# Patient Record
Sex: FEMALE | Race: WHITE | NOT HISPANIC OR LATINO | Employment: UNEMPLOYED | URBAN - METROPOLITAN AREA
[De-identification: names, ages, dates, MRNs, and addresses within clinical notes are randomized per-mention and may not be internally consistent; named-entity substitution may affect disease eponyms.]

---

## 2024-02-23 ENCOUNTER — APPOINTMENT (OUTPATIENT)
Dept: RADIOLOGY | Facility: HOSPITAL | Age: 59
End: 2024-02-23
Payer: COMMERCIAL

## 2024-02-23 ENCOUNTER — HOSPITAL ENCOUNTER (EMERGENCY)
Facility: HOSPITAL | Age: 59
Discharge: HOME | End: 2024-02-23
Attending: STUDENT IN AN ORGANIZED HEALTH CARE EDUCATION/TRAINING PROGRAM
Payer: COMMERCIAL

## 2024-02-23 VITALS
HEIGHT: 65 IN | RESPIRATION RATE: 17 BRPM | BODY MASS INDEX: 41.69 KG/M2 | HEART RATE: 74 BPM | DIASTOLIC BLOOD PRESSURE: 94 MMHG | SYSTOLIC BLOOD PRESSURE: 182 MMHG | WEIGHT: 250.22 LBS | OXYGEN SATURATION: 97 % | TEMPERATURE: 98.4 F

## 2024-02-23 DIAGNOSIS — S09.90XA HEAD INJURY, INITIAL ENCOUNTER: ICD-10-CM
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
## 2024-02-23 LAB
ALBUMIN SERPL BCP-MCNC: 4.7 G/DL (ref 3.4–5)
ALP SERPL-CCNC: 53 U/L (ref 33–136)
ALT SERPL W P-5'-P-CCNC: 25 U/L (ref 7–45)
ANION GAP SERPL CALC-SCNC: 14 MMOL/L (ref 10–20)
APPEARANCE UR: CLEAR
AST SERPL W P-5'-P-CCNC: 21 U/L (ref 9–39)
BASOPHILS # BLD AUTO: 0.08 X10*3/UL (ref 0–0.1)
BASOPHILS NFR BLD AUTO: 0.8 %
BILIRUB SERPL-MCNC: 0.8 MG/DL (ref 0–1.2)
BILIRUB UR STRIP.AUTO-MCNC: NEGATIVE MG/DL
BUN SERPL-MCNC: 7 MG/DL (ref 6–23)
CALCIUM SERPL-MCNC: 9.6 MG/DL (ref 8.6–10.3)
CHLORIDE SERPL-SCNC: 101 MMOL/L (ref 98–107)
CO2 SERPL-SCNC: 29 MMOL/L (ref 21–32)
COLOR UR: YELLOW
CREAT SERPL-MCNC: 0.65 MG/DL (ref 0.5–1.05)
EGFRCR SERPLBLD CKD-EPI 2021: >90 ML/MIN/1.73M*2
EOSINOPHIL # BLD AUTO: 0.18 X10*3/UL (ref 0–0.7)
EOSINOPHIL NFR BLD AUTO: 1.7 %
ERYTHROCYTE [DISTWIDTH] IN BLOOD BY AUTOMATED COUNT: 12.4 % (ref 11.5–14.5)
GLUCOSE SERPL-MCNC: 141 MG/DL (ref 74–99)
GLUCOSE UR STRIP.AUTO-MCNC: NEGATIVE MG/DL
HCT VFR BLD AUTO: 39.8 % (ref 36–46)
HGB BLD-MCNC: 13.5 G/DL (ref 12–16)
IMM GRANULOCYTES # BLD AUTO: 0.06 X10*3/UL (ref 0–0.7)
IMM GRANULOCYTES NFR BLD AUTO: 0.6 % (ref 0–0.9)
KETONES UR STRIP.AUTO-MCNC: NEGATIVE MG/DL
LEUKOCYTE ESTERASE UR QL STRIP.AUTO: NEGATIVE
LYMPHOCYTES # BLD AUTO: 3.85 X10*3/UL (ref 1.2–4.8)
LYMPHOCYTES NFR BLD AUTO: 36.2 %
MCH RBC QN AUTO: 29.8 PG (ref 26–34)
MCHC RBC AUTO-ENTMCNC: 33.9 G/DL (ref 32–36)
MCV RBC AUTO: 88 FL (ref 80–100)
MONOCYTES # BLD AUTO: 0.56 X10*3/UL (ref 0.1–1)
MONOCYTES NFR BLD AUTO: 5.3 %
NEUTROPHILS # BLD AUTO: 5.92 X10*3/UL (ref 1.2–7.7)
NEUTROPHILS NFR BLD AUTO: 55.4 %
NITRITE UR QL STRIP.AUTO: NEGATIVE
NRBC BLD-RTO: 0 /100 WBCS (ref 0–0)
PH UR STRIP.AUTO: 7 [PH]
PLATELET # BLD AUTO: 361 X10*3/UL (ref 150–450)
POTASSIUM SERPL-SCNC: 3.9 MMOL/L (ref 3.5–5.3)
PROT SERPL-MCNC: 7.2 G/DL (ref 6.4–8.2)
PROT UR STRIP.AUTO-MCNC: NEGATIVE MG/DL
RBC # BLD AUTO: 4.53 X10*6/UL (ref 4–5.2)
RBC # UR STRIP.AUTO: NEGATIVE /UL
SODIUM SERPL-SCNC: 140 MMOL/L (ref 136–145)
SP GR UR STRIP.AUTO: 1.01
UROBILINOGEN UR STRIP.AUTO-MCNC: <2 MG/DL
WBC # BLD AUTO: 10.7 X10*3/UL (ref 4.4–11.3)

## 2024-02-23 PROCEDURE — 80053 COMPREHEN METABOLIC PANEL: CPT | Performed by: STUDENT IN AN ORGANIZED HEALTH CARE EDUCATION/TRAINING PROGRAM

## 2024-02-23 PROCEDURE — 74177 CT ABD & PELVIS W/CONTRAST: CPT

## 2024-02-23 PROCEDURE — 72131 CT LUMBAR SPINE W/O DYE: CPT | Mod: RCN

## 2024-02-23 PROCEDURE — 72125 CT NECK SPINE W/O DYE: CPT

## 2024-02-23 PROCEDURE — 2500000001 HC RX 250 WO HCPCS SELF ADMINISTERED DRUGS (ALT 637 FOR MEDICARE OP): Performed by: STUDENT IN AN ORGANIZED HEALTH CARE EDUCATION/TRAINING PROGRAM

## 2024-02-23 PROCEDURE — 81003 URINALYSIS AUTO W/O SCOPE: CPT | Performed by: STUDENT IN AN ORGANIZED HEALTH CARE EDUCATION/TRAINING PROGRAM

## 2024-02-23 PROCEDURE — 2550000001 HC RX 255 CONTRASTS: Performed by: STUDENT IN AN ORGANIZED HEALTH CARE EDUCATION/TRAINING PROGRAM

## 2024-02-23 PROCEDURE — 36415 COLL VENOUS BLD VENIPUNCTURE: CPT | Performed by: STUDENT IN AN ORGANIZED HEALTH CARE EDUCATION/TRAINING PROGRAM

## 2024-02-23 PROCEDURE — 99285 EMERGENCY DEPT VISIT HI MDM: CPT | Mod: 25

## 2024-02-23 PROCEDURE — 71260 CT THORAX DX C+: CPT | Performed by: RADIOLOGY

## 2024-02-23 PROCEDURE — 72128 CT CHEST SPINE W/O DYE: CPT | Mod: RCN

## 2024-02-23 PROCEDURE — 72128 CT CHEST SPINE W/O DYE: CPT | Mod: RCN | Performed by: RADIOLOGY

## 2024-02-23 PROCEDURE — 72131 CT LUMBAR SPINE W/O DYE: CPT | Mod: RCN | Performed by: RADIOLOGY

## 2024-02-23 PROCEDURE — 74177 CT ABD & PELVIS W/CONTRAST: CPT | Performed by: RADIOLOGY

## 2024-02-23 PROCEDURE — 70450 CT HEAD/BRAIN W/O DYE: CPT

## 2024-02-23 PROCEDURE — 85025 COMPLETE CBC W/AUTO DIFF WBC: CPT | Performed by: STUDENT IN AN ORGANIZED HEALTH CARE EDUCATION/TRAINING PROGRAM

## 2024-02-23 PROCEDURE — 70450 CT HEAD/BRAIN W/O DYE: CPT | Performed by: RADIOLOGY

## 2024-02-23 PROCEDURE — 72125 CT NECK SPINE W/O DYE: CPT | Performed by: RADIOLOGY

## 2024-02-23 PROCEDURE — 51798 US URINE CAPACITY MEASURE: CPT

## 2024-02-23 RX ORDER — ACETAMINOPHEN AND CODEINE PHOSPHATE 300; 30 MG/1; MG/1
1 TABLET ORAL EVERY 6 HOURS PRN
Qty: 10 TABLET | Refills: 0 | Status: SHIPPED | OUTPATIENT
Start: 2024-02-23 | End: 2024-02-26

## 2024-02-23 RX ORDER — ACETAMINOPHEN AND CODEINE PHOSPHATE 300; 30 MG/1; MG/1
1 TABLET ORAL ONCE
Status: COMPLETED | OUTPATIENT
Start: 2024-02-23 | End: 2024-02-23

## 2024-02-23 RX ORDER — CYCLOBENZAPRINE HCL 5 MG
5 TABLET ORAL 3 TIMES DAILY PRN
Qty: 10 TABLET | Refills: 0 | Status: SHIPPED | OUTPATIENT
Start: 2024-02-23 | End: 2024-03-04

## 2024-02-23 RX ORDER — LIDOCAINE 50 MG/G
1 PATCH TOPICAL DAILY
Qty: 7 PATCH | Refills: 0 | Status: SHIPPED | OUTPATIENT
Start: 2024-02-23

## 2024-02-23 RX ORDER — ACETAMINOPHEN AND CODEINE PHOSPHATE 300; 30 MG/1; MG/1
1 TABLET ORAL EVERY 6 HOURS PRN
Qty: 10 TABLET | Refills: 0 | Status: SHIPPED | OUTPATIENT
Start: 2024-02-23 | End: 2024-02-23 | Stop reason: SDUPTHER

## 2024-02-23 RX ORDER — ONDANSETRON HYDROCHLORIDE 2 MG/ML
4 INJECTION, SOLUTION INTRAVENOUS ONCE
Status: DISCONTINUED | OUTPATIENT
Start: 2024-02-23 | End: 2024-02-23 | Stop reason: HOSPADM

## 2024-02-23 RX ADMIN — IOHEXOL 100 ML: 350 INJECTION, SOLUTION INTRAVENOUS at 17:00

## 2024-02-23 RX ADMIN — ACETAMINOPHEN AND CODEINE PHOSPHATE 1 TABLET: 300; 30 TABLET ORAL at 14:22

## 2024-02-23 ASSESSMENT — COLUMBIA-SUICIDE SEVERITY RATING SCALE - C-SSRS
1. IN THE PAST MONTH, HAVE YOU WISHED YOU WERE DEAD OR WISHED YOU COULD GO TO SLEEP AND NOT WAKE UP?: NO
2. HAVE YOU ACTUALLY HAD ANY THOUGHTS OF KILLING YOURSELF?: NO
6. HAVE YOU EVER DONE ANYTHING, STARTED TO DO ANYTHING, OR PREPARED TO DO ANYTHING TO END YOUR LIFE?: NO

## 2024-02-23 ASSESSMENT — PAIN DESCRIPTION - DESCRIPTORS: DESCRIPTORS: ACHING

## 2024-02-23 ASSESSMENT — PAIN SCALES - GENERAL
PAINLEVEL_OUTOF10: 7
PAINLEVEL_OUTOF10: 7
PAINLEVEL_OUTOF10: 3
PAINLEVEL_OUTOF10: 5 - MODERATE PAIN

## 2024-02-23 ASSESSMENT — PAIN DESCRIPTION - LOCATION
LOCATION: OTHER (COMMENT)
LOCATION: OTHER (COMMENT)

## 2024-02-23 ASSESSMENT — LIFESTYLE VARIABLES
EVER FELT BAD OR GUILTY ABOUT YOUR DRINKING: NO
HAVE YOU EVER FELT YOU SHOULD CUT DOWN ON YOUR DRINKING: NO
EVER HAD A DRINK FIRST THING IN THE MORNING TO STEADY YOUR NERVES TO GET RID OF A HANGOVER: NO
HAVE PEOPLE ANNOYED YOU BY CRITICIZING YOUR DRINKING: NO

## 2024-02-23 ASSESSMENT — PAIN - FUNCTIONAL ASSESSMENT
PAIN_FUNCTIONAL_ASSESSMENT: 0-10

## 2024-02-23 ASSESSMENT — PAIN DESCRIPTION - FREQUENCY: FREQUENCY: INTERMITTENT

## 2024-02-23 ASSESSMENT — PAIN DESCRIPTION - PAIN TYPE: TYPE: ACUTE PAIN

## 2024-02-23 ASSESSMENT — PAIN DESCRIPTION - PROGRESSION: CLINICAL_PROGRESSION: GRADUALLY WORSENING

## 2024-02-23 NOTE — ED PROVIDER NOTES
"HPI   Chief Complaint   Patient presents with    Fall     \"I was at a friends house and friend back up and I stepped off a step down that I did not see and fell on 2 wooden chairs.  No thinners.  No LOC.  This happened on Tuesday.\"  \"When I pee now it trickles out, I have no control.\"       Patient is a 58-year-old female with history of arthritis who presents for a fall.  Patient states she was at a friend's house on Tuesday, fell back off of a step down to 2 wooden chairs.  Hit her head and the back in 2 places.  Dors is pain on the left side of her thorax.  Denies any LOC, anticoagulation use.  States her lightheadedness is without room spinning.  Dors is mild nausea.  Endorses headache since her fall.  Denies abdominal pain, though does have some discomfort she describes as cramping.  No shortness of breath.  No saddle anesthesia or loss of bowel control.  States she has had some urinary incontinence, states she has had a past of this since she began menopause, more typically at night.                          Lucas Coma Scale Score: 15                     Patient History   History reviewed. No pertinent past medical history.  History reviewed. No pertinent surgical history.  No family history on file.  Social History     Tobacco Use    Smoking status: Never    Smokeless tobacco: Never   Vaping Use    Vaping Use: Never used   Substance Use Topics    Alcohol use: Never    Drug use: Never       Physical Exam   ED Triage Vitals [02/23/24 1313]   Temperature Heart Rate Respirations BP   36.9 °C (98.4 °F) 74 16 (!) 198/90      Pulse Ox Temp Source Heart Rate Source Patient Position   96 % Temporal Monitor Sitting      BP Location FiO2 (%)     Right arm --       Physical Exam  Constitutional:       General: She is not in acute distress.  HENT:      Head: Normocephalic.      Comments: Tenderness palpation over bilateral head with equivocal small cephalhematoma.    Eyes:      Extraocular Movements: Extraocular " movements intact.      Conjunctiva/sclera: Conjunctivae normal.      Pupils: Pupils are equal, round, and reactive to light.   Cardiovascular:      Rate and Rhythm: Normal rate and regular rhythm.      Pulses: Normal pulses.      Heart sounds: Normal heart sounds.   Pulmonary:      Effort: Pulmonary effort is normal.      Breath sounds: Normal breath sounds.   Abdominal:      General: There is no distension.      Palpations: Abdomen is soft. There is no mass.      Tenderness: There is no abdominal tenderness. There is no guarding.   Musculoskeletal:         General: No deformity.      Cervical back: Normal range of motion and neck supple.      Right lower leg: No edema.      Left lower leg: No edema.      Comments:   Tenderness to palpation over T-spine without step-offs or deformities.  No C or L-spine tenderness or step-offs or deformities.  Mild tenderness palpation to left posterior lower chest.   Skin:     General: Skin is warm and dry.      Findings: No lesion or rash.   Neurological:      General: No focal deficit present.      Mental Status: She is alert and oriented to person, place, and time. Mental status is at baseline.      Cranial Nerves: No cranial nerve deficit.      Sensory: No sensory deficit.      Motor: No weakness.   Psychiatric:         Mood and Affect: Mood normal.         ED Course & MDM   Diagnoses as of 02/24/24 1143   Fall, initial encounter   Head injury, initial encounter       Medical Decision Making  Patient is a 58-year-old female with above-stated past medical history who presents for multiple medical complaints.  Given patient's time since her injuries, and her clinical appearance, trauma activation was deferred.  Patient arrived in a c-collar and this was left in place.  Laboratory work does not show significant derangements.  Urinalysis is not consistent urinary tract infection.  Patient's postvoid residual was 0 mL which is not consistent with urinary retention.  She has no  saddle anesthesia or bowel control problems and an intact neuroexam, this lowers my suspicion for cord compression, spinal epidural abscess, cauda equina.  Patient's pain was treated and CT scans were ordered.  Patient was handed off pending CT scan results, reevaluation, and disposition.     Disclaimer: This note was dictated using speech recognition software. Minor errors in transcription may be present. Please call if questions.     Hipolito Chin MD  [facility]  Contact on [employer]        Problems Addressed:  Fall, initial encounter: acute illness or injury  Head injury, initial encounter: acute illness or injury    Amount and/or Complexity of Data Reviewed  Labs: ordered.  Radiology: ordered.        Procedure  Procedures     Hipolito Chin MD  02/24/24 7315

## 2024-02-23 NOTE — Clinical Note
Maria Del Carmen Alas was seen and treated in our emergency department on 2/23/2024.  She may return to work on 02/27/2024.       If you have any questions or concerns, please don't hesitate to call.      Lindsey Cleveland MD

## 2024-02-24 LAB — HOLD SPECIMEN: NORMAL

## 2024-02-24 NOTE — PROGRESS NOTES
"Emergency Medicine Transition of Care Note.    I received Maria Del Carmen Alas in signout from Dr. Chin.  Please see the previous ED provider note for all HPI, PE and MDM up to the time of signout at 1700. This is in addition to the primary record.    In brief Maria Del Carmen Alas is an 58 y.o. female presenting for   Chief Complaint   Patient presents with    Fall     \"I was at a friends house and friend back up and I stepped off a step down that I did not see and fell on 2 wooden chairs.  No thinners.  No LOC.  This happened on Tuesday.\"  \"When I pee now it trickles out, I have no control.\"     At the time of signout we were awaiting: CT scans    Diagnoses as of 02/23/24 2108   Fall, initial encounter   Head injury, initial encounter       Medical Decision Making      Final diagnoses:   [W19.XXXA] Fall, initial encounter   [S09.90XA] Head injury, initial encounter     58-year-old female states that 3 days ago she had a mechanical fall at a friend's house while she was trying to show her where a Box was.  She tripped over a step and fell onto a chair.  She states that she hit her head and also had some left lateral upper back pain.  She denies being on any blood thinners.  She had been given Tylenol 3 by the previous emergency physician.  She states that she came in because she has been having some worsening pain and feels like she is having a muscle spasm.  She has had no difficulty ambulating.  She drove here.  She is denying any numbness tingling or weakness.  She states that she has a history of urinary incontinence.  On my evaluation she does not have any midline C-spine T-spine LS-spine tenderness.  She does have some mild left lateral paraspinal tenderness.  Abdomen is benign.  There is no ecchymosis.  No saddle anesthesia.  She is able to ambulate.  At this time I did recommend that we give her pain medication.  She states that she really does not want anything except for Tylenol 3's.  She stated that she would " "do a low-dose of a muscle relaxer.  She states that she also would do a Lidoderm patch.  Patient had been pan scanned by the previous physician.  CT head C-spine chest abdomen pelvis T-spine as well as L-spine.  There was no acute traumatic injuries noted.  Therefore at this time patient is asking for food.  She will be discharged home as per her request.  I will have her follow-up with orthopedics for further evaluation of any lingering issues.  All her at this time I do not see evidence of acute cauda equina syndrome.  She has no saddle anesthesia negative straight leg raise and she is able to ambulate.  Candy is at the bedside.  She should have a low threshold to return.  She states that she has to drive home.  He was given sedation precautions      Procedure  Procedures    Lindsey Cleveland MD Maria Del Carmen Alas is a 58 y.o. female on day 0 of admission presenting with No Principal Problem: There is no principal problem currently on the Problem List. Please update the Problem List and refresh..    Subjective   Fall       Objective     Physical Exam    Last Recorded Vitals  Blood pressure (!) 182/94, pulse 74, temperature 36.9 °C (98.4 °F), temperature source Temporal, resp. rate 17, height 1.651 m (5' 5\"), weight 113 kg (250 lb 3.6 oz), SpO2 97 %.  Intake/Output last 3 Shifts:  No intake/output data recorded.    Relevant Results              Labs Reviewed   COMPREHENSIVE METABOLIC PANEL - Abnormal       Result Value    Glucose 141 (*)     Sodium 140      Potassium 3.9      Chloride 101      Bicarbonate 29      Anion Gap 14      Urea Nitrogen 7      Creatinine 0.65      eGFR >90      Calcium 9.6      Albumin 4.7      Alkaline Phosphatase 53      Total Protein 7.2      AST 21      Bilirubin, Total 0.8      ALT 25     URINALYSIS WITH REFLEX CULTURE AND MICROSCOPIC - Normal    Color, Urine Yellow      Appearance, Urine Clear      Specific Gravity, Urine 1.006      pH, Urine 7.0      Protein, Urine NEGATIVE      " Glucose, Urine NEGATIVE      Blood, Urine NEGATIVE      Ketones, Urine NEGATIVE      Bilirubin, Urine NEGATIVE      Urobilinogen, Urine <2.0      Nitrite, Urine NEGATIVE      Leukocyte Esterase, Urine NEGATIVE     CBC WITH AUTO DIFFERENTIAL    WBC 10.7      nRBC 0.0      RBC 4.53      Hemoglobin 13.5      Hematocrit 39.8      MCV 88      MCH 29.8      MCHC 33.9      RDW 12.4      Platelets 361      Neutrophils % 55.4      Immature Granulocytes %, Automated 0.6      Lymphocytes % 36.2      Monocytes % 5.3      Eosinophils % 1.7      Basophils % 0.8      Neutrophils Absolute 5.92      Immature Granulocytes Absolute, Automated 0.06      Lymphocytes Absolute 3.85      Monocytes Absolute 0.56      Eosinophils Absolute 0.18      Basophils Absolute 0.08     URINALYSIS WITH REFLEX CULTURE AND MICROSCOPIC    Narrative:     The following orders were created for panel order Urinalysis with Reflex Culture and Microscopic.  Procedure                               Abnormality         Status                     ---------                               -----------         ------                     Urinalysis with Reflex C...[[id number]]  Normal              Final result               Extra Urine Gray Tube[[id number]]                            In process                   Please view results for these tests on the individual orders.   EXTRA URINE GRAY TUBE        CT thoracic spine wo IV contrast   Final Result   No acute fracture or dislocation in the thoracic and lumbar spine   spine. Advanced spondylosis of the mid and lower thoracic levels.        The current study does not evaluate for ligamentous laxity or injury.   Evaluation of contents of the spinal canal is suboptimal. Follow   trauma protocol.        Please refer to the concurrent CT chest and CT abdomen and pelvis   studies for details of structures of the chest and abdomen.        Signed by: Dina Gonzales 2/23/2024 5:46 PM   Dictation workstation:   [id number]      CT  lumbar spine wo IV contrast   Final Result   No acute fracture or dislocation in the thoracic and lumbar spine   spine. Advanced spondylosis of the mid and lower thoracic levels.        The current study does not evaluate for ligamentous laxity or injury.   Evaluation of contents of the spinal canal is suboptimal. Follow   trauma protocol.        Please refer to the concurrent CT chest and CT abdomen and pelvis   studies for details of structures of the chest and abdomen.        Signed by: Dina Gonzales 2/23/2024 5:46 PM   Dictation workstation:   [id number]      CT chest abdomen pelvis w IV contrast   Final Result   CT CHEST/ABDOMEN/PELVIS:   No acute traumatic findings.        Fatty infiltration of the liver.        Suggestion of a fat density lesion surrounding the left kidney. This   is somewhat equivocal and could represent could represent a   angiomyolipoma. Body habitus limits sensitivity. Further evaluation   is advised with MRI.        Enlarged heterogeneous uterus compatible with myomatous changes.   Equivocal bladder wall thickening versus poor distention. Correlation   with urinalysis advised.        Diverticulosis. No overt stigmata of diverticulitis.        Signed by: Oracio Jarrett 2/23/2024 5:58 PM   Dictation workstation:   [id number]      CT head wo IV contrast   Final Result   CT HEAD:   No acute intracranial abnormality or calvarial fracture.             CT CERVICAL SPINE:   No acute fracture or traumatic malalignment of the cervical spine.   Multilevel spondylotic changes of the cervical spine as detailed   above,        Signed by: Oracio Jarrett 2/23/2024 5:46 PM   Dictation workstation:   [id number]      CT cervical spine wo IV contrast   Final Result   CT HEAD:   No acute intracranial abnormality or calvarial fracture.             CT CERVICAL SPINE:   No acute fracture or traumatic malalignment of the cervical spine.   Multilevel spondylotic changes of the cervical spine as  detailed   above,        Signed by: Oracio Jarrett 2/23/2024 5:46 PM   Dictation workstation:   [id number]                        Assessment/Plan   Active Problems:  There are no active Hospital Problems.    I did E prescribe medications of Tylenol 3's as well as 5 mg Flexeril's and Lidoderm patches as per the patient's request with sedation precautions       I spent 30 minutes in the professional and overall care of this patient.      Lindsey Cleveland MD